# Patient Record
Sex: MALE | Race: AMERICAN INDIAN OR ALASKA NATIVE
[De-identification: names, ages, dates, MRNs, and addresses within clinical notes are randomized per-mention and may not be internally consistent; named-entity substitution may affect disease eponyms.]

---

## 2019-09-22 ENCOUNTER — HOSPITAL ENCOUNTER (EMERGENCY)
Dept: HOSPITAL 5 - ED | Age: 49
LOS: 1 days | Discharge: HOME | End: 2019-09-23
Payer: COMMERCIAL

## 2019-09-22 DIAGNOSIS — J18.1: Primary | ICD-10-CM

## 2019-09-22 DIAGNOSIS — Z79.899: ICD-10-CM

## 2019-09-22 DIAGNOSIS — J06.9: ICD-10-CM

## 2019-09-22 DIAGNOSIS — Z79.1: ICD-10-CM

## 2019-09-22 LAB
ALBUMIN SERPL-MCNC: 3.6 G/DL (ref 3.9–5)
ALT SERPL-CCNC: 48 UNITS/L (ref 7–56)
BASOPHILS # (AUTO): 0 K/MM3 (ref 0–0.1)
BASOPHILS NFR BLD AUTO: 0.5 % (ref 0–1.8)
BUN SERPL-MCNC: 11 MG/DL (ref 9–20)
BUN/CREAT SERPL: 10 %
CALCIUM SERPL-MCNC: 8.6 MG/DL (ref 8.4–10.2)
EOSINOPHIL # BLD AUTO: 0 K/MM3 (ref 0–0.4)
EOSINOPHIL NFR BLD AUTO: 0 % (ref 0–4.3)
HCT VFR BLD CALC: 42.7 % (ref 35.5–45.6)
HEMOLYSIS INDEX: 6
HGB BLD-MCNC: 15.3 GM/DL (ref 11.8–15.2)
LYMPHOCYTES # BLD AUTO: 0.9 K/MM3 (ref 1.2–5.4)
LYMPHOCYTES NFR BLD AUTO: 9.9 % (ref 13.4–35)
MCHC RBC AUTO-ENTMCNC: 36 % (ref 32–34)
MCV RBC AUTO: 94 FL (ref 84–94)
MONOCYTES # (AUTO): 1.3 K/MM3 (ref 0–0.8)
MONOCYTES % (AUTO): 14.5 % (ref 0–7.3)
PLATELET # BLD: 177 K/MM3 (ref 140–440)
RBC # BLD AUTO: 4.56 M/MM3 (ref 3.65–5.03)

## 2019-09-22 PROCEDURE — 71046 X-RAY EXAM CHEST 2 VIEWS: CPT

## 2019-09-22 PROCEDURE — 96372 THER/PROPH/DIAG INJ SC/IM: CPT

## 2019-09-22 PROCEDURE — 99284 EMERGENCY DEPT VISIT MOD MDM: CPT

## 2019-09-22 PROCEDURE — 85025 COMPLETE CBC W/AUTO DIFF WBC: CPT

## 2019-09-22 PROCEDURE — 80053 COMPREHEN METABOLIC PANEL: CPT

## 2019-09-22 PROCEDURE — 36415 COLL VENOUS BLD VENIPUNCTURE: CPT

## 2019-09-22 PROCEDURE — 94640 AIRWAY INHALATION TREATMENT: CPT

## 2019-09-22 PROCEDURE — 96375 TX/PRO/DX INJ NEW DRUG ADDON: CPT

## 2019-09-22 PROCEDURE — 96365 THER/PROPH/DIAG IV INF INIT: CPT

## 2019-09-22 PROCEDURE — 96366 THER/PROPH/DIAG IV INF ADDON: CPT

## 2019-09-22 PROCEDURE — 81001 URINALYSIS AUTO W/SCOPE: CPT

## 2019-09-22 PROCEDURE — 87086 URINE CULTURE/COLONY COUNT: CPT

## 2019-09-22 NOTE — XRAY REPORT
CHEST 1 VIEW 9/22/2019 11:24 PM



INDICATION / CLINICAL INFORMATION:

cough and fever.



COMPARISON: 

None available.



FINDINGS:



SUPPORT DEVICES: None.



HEART / MEDIASTINUM: No significant abnormality. 



LUNGS / PLEURA: Airspace disease in the lingula of the left upper lobe. No pneumothorax. 



ADDITIONAL FINDINGS: No significant additional findings.



IMPRESSION:

1. Findings concerning for lingular pneumonia.



Signer Name: Jovany Jordan MD 

Signed: 9/22/2019 11:31 PM

 Workstation Name: [id number]

## 2019-09-22 NOTE — EMERGENCY DEPARTMENT REPORT
Blank Doc





- Documentation


Documentation: 





48 y/o AAM presents to ED c/o of HA, SOB, Cough, CP and coryza. Seen at the ur

[facility] a few days ago and dx with URI with now worsening. No vomiting or 

diarrhea. No travel or new medications. 





Plan cxr and labs

## 2019-09-23 VITALS — DIASTOLIC BLOOD PRESSURE: 74 MMHG | SYSTOLIC BLOOD PRESSURE: 122 MMHG

## 2019-09-23 LAB
BILIRUB UR QL STRIP: (no result)
BLOOD UR QL VISUAL: (no result)
MUCOUS THREADS #/AREA URNS HPF: (no result) /HPF
PH UR STRIP: 5 [PH] (ref 5–7)
RBC #/AREA URNS HPF: 3 /HPF (ref 0–6)
UROBILINOGEN UR-MCNC: 2 MG/DL (ref ?–2)
WBC #/AREA URNS HPF: 10 /HPF (ref 0–6)

## 2019-09-23 NOTE — EMERGENCY DEPARTMENT REPORT
ED General Adult HPI





- General


Chief complaint: Fever


Stated complaint: HEADACHES, SOB


Time Seen by Provider: 19 22:08


Source: patient


Mode of arrival: Ambulatory


Limitations: No Limitations





- History of Present Illness


Initial comments: 


48 y/o AAM presents to ED c/o of HA, SOB, Cough, CP and coryza. Seen at the 

urgent care a few days ago and dx with URI with now worsening. No vomiting or 

diarrhea. No travel or new medications. 








Onset/Timin


-: days(s)


Location: head, chest


Radiation: non-radiation


Severity scale (0 -10): 7


Quality: aching


Consistency: constant


Improves with: none


Worsens with: movement


Associated Symptoms: chest pain ( chest wall pain with cough ), cough, 

fever/chills, headaches, shortness of breath


Treatments Prior to Arrival: none





- Related Data


                                  Previous Rx's











 Medication  Instructions  Recorded  Last Taken  Type


 


ALBUTEROL Inhaler (OR & NICU) 2 puff IH QID PRN #1 inhalation 19 Unknown 

Rx





[ProAir HFA Inhaler]    


 


Azithromycin [Zithromax Z-ROSALVA] 250 mg PO DAILY #6 tab 19 Unknown Rx


 


Benzonatate [Tessalon Perles] 100 mg PO Q8HR PRN #30 capsule 19 Unknown Rx


 


Ibuprofen [Motrin 800 MG tab] 800 mg PO Q8HR PRN #30 tablet 19 Unknown Rx











                                    Allergies











Allergy/AdvReac Type Severity Reaction Status Date / Time


 


No Known Allergies Allergy   Unverified 19 22:08














ED Review of Systems


ROS: 


Stated complaint: HEADACHES, SOB


Other details as noted in HPI





Constitutional: chills, fever, malaise


Eyes: denies: eye pain, eye discharge, vision change


ENT: congestion


Respiratory: cough, shortness of breath


Cardiovascular: chest pain (chest wall pain with cough ).  denies: palpitations


Endocrine: no symptoms reported


Gastrointestinal: denies: abdominal pain, nausea, diarrhea


Genitourinary: denies: urgency, dysuria


Musculoskeletal: denies: back pain, joint swelling, arthralgia


Skin: denies: rash, lesions


Neurological: denies: headache, weakness, paresthesias


Psychiatric: denies: anxiety, depression


Hematological/Lymphatic: denies: easy bleeding, easy bruising





ED Past Medical Hx





- Past Medical History


Previous Medical History?: No





- Surgical History


Past Surgical History?: Yes


Additional Surgical History: Right ACL





- Social History


Smoking Status: Never Smoker


Substance Use Type: None





- Medications


Home Medications: 


                                Home Medications











 Medication  Instructions  Recorded  Confirmed  Last Taken  Type


 


ALBUTEROL Inhaler (OR & NICU) 2 puff IH QID PRN #1 inhalation 19  Unknown 

Rx





[ProAir HFA Inhaler]     


 


Azithromycin [Zithromax Z-ROSALVA] 250 mg PO DAILY #6 tab 19  Unknown Rx


 


Benzonatate [Tessalon Perles] 100 mg PO Q8HR PRN #30 capsule 19  Unknown 

Rx


 


Ibuprofen [Motrin 800 MG tab] 800 mg PO Q8HR PRN #30 tablet 19  Unknown Rx














ED Physical Exam





- General


Limitations: No Limitations


General appearance: alert, in no apparent distress





- Head


Head exam: Present: atraumatic, normocephalic





- Eye


Eye exam: Present: normal appearance, PERRL, EOMI


Pupils: Present: normal accommodation





- ENT


ENT exam: Present: normal orophraynx, mucous membranes moist, TM's normal bila

terally, normal external ear exam





- Expanded ENT Exam


  ** Expanded


Throat exam: Positive: tonsillar erythema, tonsillomegaly, other (uvula midline 

no exudate no stridor no lesions no exudate ).  Negative: tonsillar exudate, R 

peritonsillar mass, L peritonsillar mass





- Neck


Neck exam: Present: normal inspection, full ROM.  Absent: tenderness, 

meningismus, lymphadenopathy, thyromegaly





- Respiratory


Respiratory exam: Present: chest wall tenderness, decreased breath sounds (left 

lower).  Absent: wheezes, rales, rhonchi, stridor





- Cardiovascular


Cardiovascular Exam: Present: regular rate, normal rhythm, normal heart sounds. 

Absent: systolic murmur, diastolic murmur, rubs, gallop





- GI/Abdominal


GI/Abdominal exam: Present: soft, normal bowel sounds.  Absent: distended, 

tenderness, guarding, rebound, rigid, bruit, hernia





- Rectal


Rectal exam: Present: deferred





- Extremities Exam


Extremities exam: Present: normal inspection





- Back Exam


Back exam: Present: normal inspection, full ROM.  Absent: tenderness, CVA 

tenderness (R), CVA tenderness (L), muscle spasm, rash noted





- Neurological Exam


Neurological exam: Present: alert, oriented X3, CN II-XII intact, normal gait, 

reflexes normal.  Absent: motor sensory deficit





- Psychiatric


Psychiatric exam: Present: normal affect, normal mood





- Skin


Skin exam: Present: warm, dry, intact, normal color.  Absent: rash





ED Course


                                   Vital Signs











  19





  21:38 22:28


 


Temperature 101.9 F H 


 


Pulse Rate 103 H 


 


Respiratory 18 18





Rate  


 


Blood Pressure 127/84 


 


O2 Sat by Pulse 93 





Oximetry  














ED Medical Decision Making





- Lab Data


Result diagrams: 


                                 19 22:31





                                 19 22:31





- Radiology Data


Radiology results: report reviewed, image reviewed


[facility]


[address] [place] [address]





XRay Report


Signed





Patient: IKE FELIX MR#: [id number]


191


: 1970 Acct:[id number]





Age/Sex: 49 / M ADM Date: 19





Loc: ED


Attending Dr:








Ordering Physician: ZIGGY BROWN


Date of Service: 19


Procedure(s): XR chest routine 2V


Accession Number(s): [id number]





cc: ZIGGY BROWN





Fluoro Time In Minutes:











CHEST 1 VIEW 2019 11:24 PM





INDICATION / CLINICAL INFORMATION:


cough and fever.





COMPARISON:


None available.





FINDINGS:





SUPPORT DEVICES: None.





HEART / MEDIASTINUM: No significant abnormality.





LUNGS / PLEURA: Airspace disease in the lingula of the left upper lobe. No 

pneumothorax.





ADDITIONAL FINDINGS: No significant additional findings.





IMPRESSION:


1. Findings concerning for lingular pneumonia.





Signer Name: Jovany Jordan MD


Signed: 2019 11:31 PM


Workstation Name: [id number]








Transcribed By: AARON


Dictated By: Jovany Jordan MD


Electronically Authenticated By: Jovany Jordan MD


Signed Date/Time: 19











DD/DT: 19


TD/TT:








- Medical Decision Making


cxr BASIL Pnuemonia, symptoms improved with medications given in ed, plan, 

azithromycin,  Ibuprofen, abluterol inhaler, follow up with pcp in 2-3 days 

return to ed if symptoms worsen. 





Critical care attestation.: 


If time is entered above; I have spent that time in minutes in the direct care 

of this critically ill patient, excluding procedure time.








ED Disposition


Clinical Impression: 


CAP (community acquired pneumonia)


Qualifiers:


 Laterality: left Lung location: upper lobe of lung Qualified Code(s): J18.1 - 

Lobar pneumonia, unspecified organism





Disposition:  TO HOME OR SELFCARE


Is pt being admited?: No


Does the pt Need Aspirin: No


Condition: Stable


Instructions:  Bacterial Pneumonia (ED), Community-acquired Pneumonia (ED)


Prescriptions: 


Ibuprofen [Motrin 800 MG tab] 800 mg PO Q8HR PRN #30 tablet


 PRN Reason: pain fever 


ALBUTEROL Inhaler (OR & NICU) [ProAir HFA Inhaler] 2 puff IH QID PRN #1 

inhalation


 PRN Reason: Shortness Of Breath


Benzonatate [Tessalon Perles] 100 mg PO Q8HR PRN #30 capsule


 PRN Reason: Cough


Azithromycin [Zithromax Z-ROSALVA] 250 mg PO DAILY #6 tab


Referrals: 


PRIMARY CARE,MD [Primary Care Provider] - 3-5 Days


Forms:  Work/School Release Form(ED)


Time of Disposition: 05:06